# Patient Record
Sex: MALE | Race: OTHER | URBAN - METROPOLITAN AREA
[De-identification: names, ages, dates, MRNs, and addresses within clinical notes are randomized per-mention and may not be internally consistent; named-entity substitution may affect disease eponyms.]

---

## 2022-08-28 ENCOUNTER — HOSPITAL ENCOUNTER (EMERGENCY)
Age: 22
Discharge: HOME | End: 2022-08-28
Attending: EMERGENCY MEDICINE

## 2022-08-28 VITALS
DIASTOLIC BLOOD PRESSURE: 79 MMHG | RESPIRATION RATE: 18 BRPM | SYSTOLIC BLOOD PRESSURE: 124 MMHG | HEART RATE: 69 BPM | OXYGEN SATURATION: 100 %

## 2022-08-28 DIAGNOSIS — T15.01XA FOREIGN BODY OF RIGHT CORNEA, INITIAL ENCOUNTER: Primary | ICD-10-CM

## 2022-08-28 DIAGNOSIS — H18.891 CORNEAL RUST RING OF RIGHT EYE: ICD-10-CM

## 2022-08-28 PROCEDURE — 6370000000 HC RX 637 (ALT 250 FOR IP): Performed by: PHYSICIAN ASSISTANT

## 2022-08-28 PROCEDURE — 99283 EMERGENCY DEPT VISIT LOW MDM: CPT

## 2022-08-28 RX ORDER — ERYTHROMYCIN 5 MG/G
OINTMENT OPHTHALMIC ONCE
Status: COMPLETED | OUTPATIENT
Start: 2022-08-28 | End: 2022-08-28

## 2022-08-28 RX ADMIN — ERYTHROMYCIN: 5 OINTMENT OPHTHALMIC at 20:51

## 2022-08-28 ASSESSMENT — PAIN SCALES - GENERAL: PAINLEVEL_OUTOF10: 4

## 2022-08-28 ASSESSMENT — PAIN DESCRIPTION - LOCATION: LOCATION: EYE

## 2022-08-28 ASSESSMENT — PAIN - FUNCTIONAL ASSESSMENT: PAIN_FUNCTIONAL_ASSESSMENT: 0-10

## 2022-08-28 ASSESSMENT — PAIN DESCRIPTION - ORIENTATION: ORIENTATION: RIGHT

## 2022-08-29 NOTE — ED NOTES
Pt to ER with family, ambulated to room, steady gait, Setswana primary language so language interpretor used throughout entire triage. Pt reports he thinks metal is in his right eye, started yesterday, reports he and his brother tried to remove object but could not. Pt reports today pain started with blurred vision, sclera does appear to be red, pt reports pain 4/10, denies analgesics PTA.  Pt calm, cooperative, respers even non labored, skin warm pink, no distress, here for eval.      Calixto Parrish RN  08/28/22 2018

## 2022-08-29 NOTE — DISCHARGE INSTRUCTIONS
Please call Dr. Emilie Hampton, Ophthalmologist [facility] Doctor) for re-evaluation of your corneal injury and rust ring. Address: [facility] [address], [place], [address]  Phone: [phone]    Please use Ibuprofen and Tylenol for eye pain as needed. You may also need to use sunglasses for a few days as your eye irritation improves.         ACETAMINOPHEN (Tylenol):  [Pain relief, fever reducer]    Pediatric Dosing-    > 12 YRS OLD  =  Adult dosing    1 YR - 12 YRS OLD:  10-15 mg/kg dose every 4-6 hours as needed     DO NOT EXCEED 5 doses/24 hr    Birth - 1 YR OLD:   10-15 mg/kg dose every 6-8 hours as needed       Adult Dosin-650 mg every 4-6 hr as needed    1000 mg  every 4-6hr as needed; NMT 4 g/daily    Extended Relief: 2 caplets (1300 mg) every 8 hrs as needed    DO NOT EXCEED 4000mg DAILY !!!        IBUPROFEN  (Motrin, Advil):  [Anti-inflammatory, Pain relief and Fever reducer)    Pediatric Dosing:  10 mg/kg dose every 8 hours as needed    Adult Dosin-800mg every 8 hrs as needed    DO NOT EXCEED 2400mg DAILY !!!

## 2022-08-29 NOTE — ED PROVIDER NOTES
[id number] [facility]  [address] [address] [facility] [address] [facility] [address]  Phone: [phone]  Fax: [phone]      Attending Physician Attestation    I performed a history and physical examination of the patient and discussed management with the mid level provider. I reviewed the mid level provider's note and agree with the documented findings and plan of care. Any areas of disagreement are noted on the chart. I was personally present for the key portions of any procedures. I have documented in the chart those procedures where I was not present during the key portions. I have reviewed the emergency nurses triage note. I agree with the chief complaint, past medical history, past surgical history, allergies, medications, social and family history as documented unless otherwise noted below. Documentation of the HPI, Physical Exam and Medical Decision Making performed by mid level providers is based on my personal performance of the HPI, PE and MDM. For Physician Assistant/ Nurse Practitioner cases/documentation I have personally evaluated this patient and have completed at least one if not all key elements of the E/M (history, physical exam, and MDM). Additional findings are as noted. CHIEF COMPLAINT       Chief Complaint   Patient presents with    Eye Pain     right         HISTORY OF PRESENT ILLNESS    Boy Tracy is a 25 y.o. male who presents for evaluation of right eye pain. The patient is Citizen of the [place]-speaking only and history is obtained by using a . The patient reports that yesterday he was working and piece of metal fell into his right eye. He tried flushing out his right eye with water without improvement. He continues to have pain, eye irritation, and foreign body sensation to his right eye. The patient does not wear contacts or glasses. He has never had any eye surgery. He has some blurry vision from tearing but this clears with blinking.   The patient has not taken any medications for symptoms and does not list any provoking or palliating factors. He denies fever, chills, headache, neck pain, back pain, chest pain, shortness of breath, abdominal pain, urinary/bowel symptoms, focal weakness, numbness, tingling, recent injury or illness. PAST MEDICAL HISTORY    has no past medical history on file. The patient denies    SURGICAL HISTORY      has no past surgical history on file. The patient denies    CURRENT MEDICATIONS       There are no discharge medications for this patient. ALLERGIES     has No Known Allergies. FAMILY HISTORY     has no family status information on file. family history is not on file. SOCIAL HISTORY      reports that he has never smoked. He has never been exposed to tobacco smoke. He has never used smokeless tobacco. He reports that he does not currently use alcohol. He reports that he does not use drugs. PHYSICAL EXAM     INITIAL VITALS:  blood pressure is 124/79 and his pulse is 69. His respiration is 18 and oxygen saturation is 100%. Eye Exam Procedure Note  Indication: right eye injection and foreign body sensation  Procedure: The patient was placed in the appropriate position. Anesthesia was obtained using tetracaine drops. Fluorescein staining was used. Slit lamp exam was performed. Ocular ultrasound was not performed.    Visual acuity: B 20/20, L 20/20, R 20/20  Pupils: 3mm equal round reactive  EOMI: intact  Fundoscopic exam:  Optic nerve: Normal cup-to-disc ratio  Vessels: Normal contour and size  Eyelid inversion: no foreign body or lesions seen under the right upper eyelid  Lids/lashes/lacrimal system: Normal anatomy and contours  Conjunctiva/sclera: right eye injected, left eye clear  Cornea: right eye has a small metallic foreign body over the central cornea which was removed with a cotton tip applicator, there is a tiny residual rust ring  Anterior chamber: normal depth, no cell or flare  Iris:Round pupil without defects  Lens: clear  Anterior vitreous: No inflammation, hemorrhage, or pigmented cells  Fluorescein uptake: was seen over the area where the foreign body was removed over the central cornea   Shanelle sign: negative     The patient tolerated the procedure well  Complications: none        DIAGNOSTIC RESULTS     EKG: All EKG's are interpreted by the Emergency Department Physician who either signs or Co-signs this chart in the absence of a cardiologist.    None    RADIOLOGY:     None    LABS:  No results found for this visit on 08/28/22. None    EMERGENCY DEPARTMENT COURSE:   Vitals:    Vitals:    08/28/22 2014   BP: 124/79   Pulse: 69   Resp: 18   SpO2: 100%     -------------------------  BP: 124/79,  , Heart Rate: 69, Resp: 18      PERTINENT ATTENDING PHYSICIAN COMMENTS:    The patient is a 50-year-old male who presents for evaluation of a foreign body to his right eye. He was found to have a small piece of metal embedded in his central cornea that was removed with a cotton tip applicator. There is a tiny residual rust ring that does not merit being removed at this time. His visual acuity is normal.  Vital signs are stable. We do not have a recorded temperature but the patient's body temperature felt normal on exam.  The patient was started on erythromycin ointment and was instructed to follow-up with a eye doctor within 1 to 2 days. He was instructed to take ibuprofen or Tylenol as needed for pain and to return to the ER for worsening symptoms or any other concern. The patient understands that at this time there is no evidence for a more malignant underlying process, but also understands that early in the process of an illness or injury, an emergency department work-up can be falsely reassuring.   Routine discharge counseling was given, and the patient understands that worsening, changing or persistent symptoms should prompt a immediate call or follow-up with their primary care physician or return to the emergency department. The importance of appropriate follow-up was also discussed. I have reviewed the disposition diagnosis with the patient. I have answered their questions and given discharge instructions. They voiced understanding of these instructions and did not have any further questions or complaints.         (Please note that portions of this note were completed with a voice recognition program.  Efforts were made to edit the dictations but occasionally words are mis-transcribed.)    Isaura Good DO, [facility]  Attending Emergency Medicine Physician       Isaura Good DO  08/28/22 [id number]

## 2022-08-29 NOTE — ED PROVIDER NOTES
Allergies. PHYSICAL EXAM     INITIAL VITALS: /79   Pulse 69   Resp 18   SpO2 100%   Constitutional:  Well developed   Eyes:  mild generalized infection. Anterior chamber intact/clear bilaterally, no flare or hyphema. No chemosis. Pupils equal and readily reactive to light, EOMI. Lid of affected eye everted, normal to inspection and no loose FB. Fluorescein staining demonstrates suspected retained FB of right right with [employer]. No periorbital/lid edema or injury. + photophobia. HENT:  Atraumatic, external ears normal, nose normal, oropharynx moist  Respiratory:  Comfortable speech and breathing  Musculoskeletal:  Normal to inspection  Integument:   No rash. Neurologic:  Alert, age appropriate mentation, no focal deficits noted       DIAGNOSTIC RESULTS     EKG: All EKG's are interpreted by the Emergency Department Physician who either signs or Co-signs this chart in the absence of a cardiologist.  Not indicated    RADIOLOGY:   Reviewed the radiologist:  No orders to display     Not indicated    LABS:  Labs Reviewed - No data to display  Not indicated          Slit Lamp Exam Procedure Note  Indication:  Corneal FB  Procedure: The patient was placed in the appropriate position. Anesthesia was obtained using proparacaine drops in the right eye. Fluorescein staining with slit lamp examination was performed in the right eye and revealed a corneal FB of about 1 mm at the 9 o'clock position, centrally located. The patient tolerated the procedure well. Complications: None. Rust ring present, no overt additional retained FB appreciated. No loose FB visualized. EMERGENCY DEPARTMENT COURSE:     2032  FB within cornea, removed with damp cotton tip. No vision changes other than some blurred vision at times. I was able to communicate via a friend on the phone who speaks [place]. No issues with communication during my exam of this patient. Emycin ointment ordered.   I plan to provide Optho f/u for this patient. I explained our plan and I answered questions. He confirms no Eye doctor in the area and they I can provide contact information for him. 2041  Attending completed her exam as well and reiterated our discharge instructions/plan. We will defer any additional removal of rust ring to an Eye Specialist.   [facility] [place] will be provided. Orders Placed This Encounter   Medications    erythromycin (ROMYCIN) ophthalmic ointment       CONSULTS:  None      FINAL IMPRESSION      1. Foreign body of right cornea, initial encounter    2. Corneal rust ring of right eye          DISPOSITION/PLAN:  DISPOSITION Decision To Discharge 08/28/2022 08:42:19 PM        PATIENT REFERRED TO:  No follow-up provider specified.     DISCHARGE MEDICATIONS:  New Prescriptions    No medications on file       (Please note that portions of this note were completed with a voice recognition program.  Efforts were made to edit the dictations but occasionally words are mis-transcribed.)    Maritza Client, CHELSI Gongora, CHELSI  08/28/22 2048